# Patient Record
Sex: MALE | URBAN - METROPOLITAN AREA
[De-identification: names, ages, dates, MRNs, and addresses within clinical notes are randomized per-mention and may not be internally consistent; named-entity substitution may affect disease eponyms.]

---

## 2019-03-12 ENCOUNTER — NURSE TRIAGE (OUTPATIENT)
Dept: NURSING | Facility: CLINIC | Age: 19
End: 2019-03-12

## 2019-03-13 NOTE — TELEPHONE ENCOUNTER
Lulu calls and says that he developed an abscess by his anus, 2 days ago.  Abscess is painful and pt. Says that it is difficult to sit. Pain = 8/10. Pt. Says that it resembles a boil and has pus coming out.    Reason for Disposition    Center of the boil is soft or pus-colored (Exception: pimple)    Additional Information    Negative: [1] Widespread red rash AND [2] fever AND [3] fainted or too weak to stand    Negative: Sounds like a life-threatening emergency to the triager    Negative: [1] Boil (skin abscess) AND [2] taking an antibiotic and/or incised and drained    Negative: Boil is part of a wound infection    Negative: Impetigo (sores and scabs) suspected (no boil)    Negative: Doesn't match the SYMPTOMS of a boil    Negative: MRSA, questions about (No boil or other skin lesion)    Negative: Fever    Negative: Widespread red rash    Negative: Age < 1 month    Negative: Child sounds very sick or weak to the triager    Negative: Age < 1 year    Negative: [1] Spreading redness around the boil AND [2] no fever    Negative: Boil on the face    Negative: Boil overlying a joint    Negative: 2 or more boils    Negative: Size > 2 inches (5 cm) across    Negative: Immune-compromised (HIV, sickle cell disease, splenectomy, chemotherapy, organ transplant)    Protocols used: [facility] (SKIN ABSCESS[facility]